# Patient Record
Sex: FEMALE | Race: BLACK OR AFRICAN AMERICAN
[De-identification: names, ages, dates, MRNs, and addresses within clinical notes are randomized per-mention and may not be internally consistent; named-entity substitution may affect disease eponyms.]

---

## 2024-07-15 ENCOUNTER — DOCTOR'S OFFICE (OUTPATIENT)
Age: 24
Setting detail: OPHTHALMOLOGY
End: 2024-07-15
Payer: COMMERCIAL

## 2024-07-15 ENCOUNTER — RX ONLY (RX ONLY)
Age: 24
End: 2024-07-15

## 2024-07-15 DIAGNOSIS — S05.02XA: ICD-10-CM

## 2024-07-15 PROCEDURE — 92071 CONTACT LENS FITTING FOR TX: CPT | Mod: RT | Performed by: OPHTHALMOLOGY

## 2024-07-15 PROCEDURE — 92002 INTRM OPH EXAM NEW PATIENT: CPT | Performed by: OPHTHALMOLOGY

## 2024-07-18 ENCOUNTER — DOCTOR'S OFFICE (OUTPATIENT)
Age: 24
Setting detail: OPHTHALMOLOGY
End: 2024-07-18
Payer: COMMERCIAL

## 2024-07-18 ENCOUNTER — RX ONLY (RX ONLY)
Age: 24
End: 2024-07-18

## 2024-07-18 DIAGNOSIS — S05.02XD: ICD-10-CM

## 2024-07-18 PROCEDURE — 92012 INTRM OPH EXAM EST PATIENT: CPT | Performed by: OPHTHALMOLOGY

## 2025-01-03 ENCOUNTER — EMERGENCY (EMERGENCY)
Facility: HOSPITAL | Age: 25
LOS: 1 days | Discharge: STILL ADMITTED OR ADMINISTRATIVE | End: 2025-01-03
Admitting: EMERGENCY MEDICINE

## 2025-01-03 ENCOUNTER — OUTPATIENT (OUTPATIENT)
Dept: INPATIENT UNIT | Facility: HOSPITAL | Age: 25
LOS: 1 days | Discharge: HOME | End: 2025-01-03
Payer: MEDICAID

## 2025-01-03 ENCOUNTER — APPOINTMENT (OUTPATIENT)
Dept: ANTEPARTUM | Facility: CLINIC | Age: 25
End: 2025-01-03

## 2025-01-03 VITALS
DIASTOLIC BLOOD PRESSURE: 77 MMHG | HEART RATE: 98 BPM | TEMPERATURE: 98 F | RESPIRATION RATE: 20 BRPM | OXYGEN SATURATION: 100 % | SYSTOLIC BLOOD PRESSURE: 101 MMHG

## 2025-01-03 VITALS
WEIGHT: 166.89 LBS | HEART RATE: 101 BPM | TEMPERATURE: 99 F | SYSTOLIC BLOOD PRESSURE: 108 MMHG | RESPIRATION RATE: 20 BRPM | OXYGEN SATURATION: 100 % | DIASTOLIC BLOOD PRESSURE: 67 MMHG

## 2025-01-03 VITALS — TEMPERATURE: 98 F | RESPIRATION RATE: 16 BRPM

## 2025-01-03 VITALS
RESPIRATION RATE: 16 BRPM | DIASTOLIC BLOOD PRESSURE: 71 MMHG | TEMPERATURE: 98 F | SYSTOLIC BLOOD PRESSURE: 106 MMHG | HEART RATE: 102 BPM

## 2025-01-03 DIAGNOSIS — O26.899 OTHER SPECIFIED PREGNANCY RELATED CONDITIONS, UNSPECIFIED TRIMESTER: ICD-10-CM

## 2025-01-03 DIAGNOSIS — Z98.890 OTHER SPECIFIED POSTPROCEDURAL STATES: Chronic | ICD-10-CM

## 2025-01-03 PROCEDURE — 99221 1ST HOSP IP/OBS SF/LOW 40: CPT | Mod: 25

## 2025-01-03 PROCEDURE — L9996: CPT

## 2025-01-03 PROCEDURE — 83986 ASSAY PH BODY FLUID NOS: CPT | Mod: QW

## 2025-01-03 PROCEDURE — 59025 FETAL NON-STRESS TEST: CPT | Mod: 26

## 2025-01-03 NOTE — ED ADULT TRIAGE NOTE - CHIEF COMPLAINT QUOTE
Patient reports that she is 18 weeks pregnant with twins and reports feeling discharge at 3pm. Patient to be taken to L&D as per Cate, patient evaluated by Dr. Morales in triage. No pertinent medical history

## 2025-01-03 NOTE — OB PROVIDER TRIAGE NOTE - HISTORY OF PRESENT ILLNESS
PNC with   PNC with Dr [facility]  25 y/o  TIUP  @ 18.2 wks gestation presents with c/o LOF @ 1500 states had a " gush " of clear fluid and none since then states last had intercourse on 2025 @  denies any uc's or vb states has not felt FM yet denies any fever or chills denies any n/v/d denies any hematuria or dysuria ap care comp by :   TIORALIA - mono- di  PNC with Dr [facility]  23 y/o  TIUP  @ 18.2 wks gestation presents with c/o LOF @ 1500 states had a " gush " of clear fluid and none since then states last had intercourse on 2025 @  denies any uc's or vb states has not felt FM yet denies any fever or chills denies any n/v/d denies any hematuria or dysuria ap care comp by :   TIUP - mono- di   progesterone PV hs pt did not make up prescription

## 2025-01-03 NOTE — OB RN TRIAGE NOTE - CURRENT PREGNANCY COMPLICATIONS, OB PROFILE
Multiple Gestation/Gestational Age less than 36 Weeks mono/di TIUP/Multiple Gestation/Gestational Age less than 36 Weeks

## 2025-01-03 NOTE — OB PROVIDER TRIAGE NOTE - ADDITIONAL INSTRUCTIONS
no evidence of PPROM   maternal and fetal status reassuring   pt to be discharged home   increase fluid intake  follow up with [facility] as sched   follow up with [facility] as sched  w/v discharge instructions given  discharged home   discharged @ 1901

## 2025-01-03 NOTE — OB PROVIDER TRIAGE NOTE - NSHPPHYSICALEXAM_GEN_ALL_CORE
abdomen: soft, nt on palp  SSE: no evidence of pooling  cervical mucous noted   cervix appears closed and posterior   nitrazine- equivocal  fern- negative  sono reports in [facility]  sono images in [facility]   TAS   "A" transverse maternal left anterior placenta MVP: 5.54 FH: 153 bpm   "B" breech anterior placenta MVP: 4.05 FH: 160   T(C): 36.9 (01-03-25 @ 18:37), Max: 37.1 (01-03-25 @ 16:49)  HR: 102 (01-03-25 @ 18:37) (88 - 102)  BP: 106/71 (01-03-25 @ 18:37) (101/77 - 109/70)  RR: 16 (01-03-25 @ 18:37) (16 - 20)  SpO2: 100% (01-03-25 @ 17:39) (100% - 100%) abdomen: soft, nt on palp  SSE: no evidence of pooling  cervical mucous noted   cervix appears closed and posterior   nitrazine- equivocal  fern- negative  sono reports in [facility]  sono images in [facility]   TAS   TVUS: Cervical length 4.3-4.4cm, no dynamic changes  "A" transverse maternal left anterior placenta MVP: 5.54 FH: 153 bpm   "B" breech anterior placenta MVP: 4.05 FH: 160   T(C): 36.9 (01-03-25 @ 18:37), Max: 37.1 (01-03-25 @ 16:49)  HR: 102 (01-03-25 @ 18:37) (88 - 102)  BP: 106/71 (01-03-25 @ 18:37) (101/77 - 109/70)  RR: 16 (01-03-25 @ 18:37) (16 - 20)  SpO2: 100% (01-03-25 @ 17:39) (100% - 100%)

## 2025-01-03 NOTE — OB PROVIDER TRIAGE NOTE - NSOBPROVIDERNOTE_OBGYN_ALL_OB_FT
25 y/o  @ 18.2 TIUP, mono- di, r/o PPROM   plan of care d/w dr Chacon  no evidence of PPROM   maternal and fetal status reassuring   pt to be discharged home   increase fluid intake  follow up with [facility] as sched   follow up with [facility] as sched  w/v discharge instructions given  discharged home   discharged @ 1901 25 y/o  @ 18.2 TIUP, mono- di, r/o PPROM   plan of care d/w dr Chacon  no evidence of PPROM   maternal and fetal status reassuring   pt to be discharged home   increase fluid intake  follow up with [facility] as sched   follow up with [facility] as sched  w/v discharge instructions given  discharged home   discharged @ [id number] pt reports she is starting to have pelvic pain that just started   pt to have her cervix evaluated   will continue to monitor 23 y/o  @ 18.2 TIUP, mono- di, r/o PPROM   plan of care d/w dr Chacon  no evidence of PPROM   maternal and fetal status reassuring   pt to be discharged home   increase fluid intake  follow up with [facility] as sched   follow up with [facility] as sched  w/v discharge instructions given  discharged home   discharged @ 1901 pt reports she is starting to have pelvic pain that just started   pt to have her cervix evaluated   will continue to monitor    @:   Celestina Alcaraz, patient signed out from day shift  Cervical length 4.3-4.4, no dynamic chages  Patient states she has had round ligament pain throughout pregnancy and this feels similar  Has f/u appt on 25  Stable for discharged  Reassuring fetal/maternal status 23 y/o  @ 18.2 TIUP, mono- di, r/o PPROM   plan of care d/w dr Chacon  no evidence of PPROM   maternal and fetal status reassuring   pt to be discharged home   increase fluid intake  follow up with [facility] as sched   follow up with [facility] as sched  w/v discharge instructions given  discharged home   discharged @ 1901 pt reports she is starting to have pelvic pain that just started   pt to have her cervix evaluated   will continue to monitor    @:   Celestina Alcaraz, patient signed out from day shift  Cervical length 4.3-4.4, no dynamic chages  Patient states she has had round ligament pain throughout pregnancy and this feels similar  Has f/u appt on 25  Stable for discharged  Reassuring fetal/maternal status  Discharged @     - Patient to be discharged home with follow up and return precautions  - Please follow up with your obstetrician at your next scheduled appointment.   - Please return for decreased / no fetal movement, vaginal bleeding similar to that of a period, leaking / gush of fluid, regular contractions occurring 4-5 minutes  for one hour or requiring pain medication   - Patient educated of plan and demonstrates understanding. All questions answered. Discharge instructions provided and signed

## 2025-01-04 ENCOUNTER — INPATIENT (INPATIENT)
Facility: HOSPITAL | Age: 25
LOS: 1 days | Discharge: HOME | End: 2025-01-06
Attending: OBSTETRICS & GYNECOLOGY | Admitting: OBSTETRICS & GYNECOLOGY
Payer: MEDICAID

## 2025-01-04 VITALS
SYSTOLIC BLOOD PRESSURE: 117 MMHG | OXYGEN SATURATION: 100 % | DIASTOLIC BLOOD PRESSURE: 97 MMHG | HEART RATE: 123 BPM | RESPIRATION RATE: 22 BRPM

## 2025-01-04 DIAGNOSIS — Z98.890 OTHER SPECIFIED POSTPROCEDURAL STATES: Chronic | ICD-10-CM

## 2025-01-04 DIAGNOSIS — O36.4XX0 MATERNAL CARE FOR INTRAUTERINE DEATH, NOT APPLICABLE OR UNSPECIFIED: ICD-10-CM

## 2025-01-04 LAB
ALBUMIN SERPL ELPH-MCNC: 3.6 G/DL — SIGNIFICANT CHANGE UP (ref 3.3–5)
ALP SERPL-CCNC: 63 U/L — SIGNIFICANT CHANGE UP (ref 40–120)
ALT FLD-CCNC: 50 U/L — HIGH (ref 4–33)
ANION GAP SERPL CALC-SCNC: 12 MMOL/L — SIGNIFICANT CHANGE UP (ref 7–14)
APTT BLD: 26.7 SEC — SIGNIFICANT CHANGE UP (ref 24.5–35.6)
AST SERPL-CCNC: 22 U/L — SIGNIFICANT CHANGE UP (ref 4–32)
BASOPHILS # BLD AUTO: 0.08 K/UL — SIGNIFICANT CHANGE UP (ref 0–0.2)
BASOPHILS NFR BLD AUTO: 0.5 % — SIGNIFICANT CHANGE UP (ref 0–2)
BILIRUB SERPL-MCNC: 0.2 MG/DL — SIGNIFICANT CHANGE UP (ref 0.2–1.2)
BLD GP AB SCN SERPL QL: NEGATIVE — SIGNIFICANT CHANGE UP
BUN SERPL-MCNC: 4 MG/DL — LOW (ref 7–23)
CALCIUM SERPL-MCNC: 9.3 MG/DL — SIGNIFICANT CHANGE UP (ref 8.4–10.5)
CHLORIDE SERPL-SCNC: 103 MMOL/L — SIGNIFICANT CHANGE UP (ref 98–107)
CO2 SERPL-SCNC: 20 MMOL/L — LOW (ref 22–31)
CREAT SERPL-MCNC: 0.57 MG/DL — SIGNIFICANT CHANGE UP (ref 0.5–1.3)
EGFR: 128 ML/MIN/1.73M2 — SIGNIFICANT CHANGE UP
EOSINOPHIL # BLD AUTO: 0.28 K/UL — SIGNIFICANT CHANGE UP (ref 0–0.5)
EOSINOPHIL NFR BLD AUTO: 1.7 % — SIGNIFICANT CHANGE UP (ref 0–6)
FIBRINOGEN PPP-MCNC: 668 MG/DL — HIGH (ref 200–465)
GLUCOSE SERPL-MCNC: 85 MG/DL — SIGNIFICANT CHANGE UP (ref 70–99)
HCT VFR BLD CALC: 32.3 % — LOW (ref 34.5–45)
HGB BLD-MCNC: 10.3 G/DL — LOW (ref 11.5–15.5)
IANC: 11.52 K/UL — HIGH (ref 1.8–7.4)
IMM GRANULOCYTES NFR BLD AUTO: 2.5 % — HIGH (ref 0–0.9)
INR BLD: 0.94 RATIO — SIGNIFICANT CHANGE UP (ref 0.85–1.16)
LACTATE SERPL-SCNC: 1.6 MMOL/L — SIGNIFICANT CHANGE UP (ref 0.5–2)
LYMPHOCYTES # BLD AUTO: 12.4 % — LOW (ref 13–44)
LYMPHOCYTES # BLD AUTO: 2 K/UL — SIGNIFICANT CHANGE UP (ref 1–3.3)
MCHC RBC-ENTMCNC: 27 PG — SIGNIFICANT CHANGE UP (ref 27–34)
MCHC RBC-ENTMCNC: 31.9 G/DL — LOW (ref 32–36)
MCV RBC AUTO: 84.8 FL — SIGNIFICANT CHANGE UP (ref 80–100)
MONOCYTES # BLD AUTO: 1.88 K/UL — HIGH (ref 0–0.9)
MONOCYTES NFR BLD AUTO: 11.6 % — SIGNIFICANT CHANGE UP (ref 2–14)
NEUTROPHILS # BLD AUTO: 11.52 K/UL — HIGH (ref 1.8–7.4)
NEUTROPHILS NFR BLD AUTO: 71.3 % — SIGNIFICANT CHANGE UP (ref 43–77)
NRBC # BLD: 0 /100 WBCS — SIGNIFICANT CHANGE UP (ref 0–0)
NRBC # FLD: 0.02 K/UL — HIGH (ref 0–0)
PLATELET # BLD AUTO: 365 K/UL — SIGNIFICANT CHANGE UP (ref 150–400)
POTASSIUM SERPL-MCNC: 3.7 MMOL/L — SIGNIFICANT CHANGE UP (ref 3.5–5.3)
POTASSIUM SERPL-SCNC: 3.7 MMOL/L — SIGNIFICANT CHANGE UP (ref 3.5–5.3)
PROT SERPL-MCNC: 6.6 G/DL — SIGNIFICANT CHANGE UP (ref 6–8.3)
PROTHROM AB SERPL-ACNC: 11.2 SEC — SIGNIFICANT CHANGE UP (ref 9.9–13.4)
RBC # BLD: 3.81 M/UL — SIGNIFICANT CHANGE UP (ref 3.8–5.2)
RBC # FLD: 14.1 % — SIGNIFICANT CHANGE UP (ref 10.3–14.5)
RH IG SCN BLD-IMP: POSITIVE — SIGNIFICANT CHANGE UP
RH IG SCN BLD-IMP: POSITIVE — SIGNIFICANT CHANGE UP
SODIUM SERPL-SCNC: 135 MMOL/L — SIGNIFICANT CHANGE UP (ref 135–145)
T PALLIDUM AB TITR SER: NEGATIVE — SIGNIFICANT CHANGE UP
WBC # BLD: 16.17 K/UL — HIGH (ref 3.8–10.5)
WBC # FLD AUTO: 16.17 K/UL — HIGH (ref 3.8–10.5)

## 2025-01-04 PROCEDURE — 88304 TISSUE EXAM BY PATHOLOGIST: CPT | Mod: 26

## 2025-01-04 PROCEDURE — 99284 EMERGENCY DEPT VISIT MOD MDM: CPT

## 2025-01-04 PROCEDURE — 88307 TISSUE EXAM BY PATHOLOGIST: CPT | Mod: 26

## 2025-01-04 RX ORDER — OXYTOCIN IN 5 % DEXTROSE 20/500ML
167 PLASTIC BAG, INJECTION (ML) INTRAVENOUS
Qty: 30 | Refills: 0 | Status: DISCONTINUED | OUTPATIENT
Start: 2025-01-04 | End: 2025-01-05

## 2025-01-04 RX ORDER — BENZOCAINE/MENTH/CETYLPYRD CL 15 MG-4 MG
1 LOZENGE MUCOUS MEMBRANE EVERY 6 HOURS
Refills: 0 | Status: DISCONTINUED | OUTPATIENT
Start: 2025-01-04 | End: 2025-01-06

## 2025-01-04 RX ORDER — MAGNESIUM HYDROXIDE 400 MG/5ML
30 SUSPENSION, ORAL (FINAL DOSE FORM) ORAL
Refills: 0 | Status: DISCONTINUED | OUTPATIENT
Start: 2025-01-04 | End: 2025-01-06

## 2025-01-04 RX ORDER — PIPERACILLIN AND TAZOBACTAM 3; .375 G/15ML; G/15ML
4.5 INJECTION, POWDER, LYOPHILIZED, FOR SOLUTION INTRAVENOUS EVERY 8 HOURS
Refills: 0 | Status: COMPLETED | OUTPATIENT
Start: 2025-01-04 | End: 2025-01-04

## 2025-01-04 RX ORDER — DOXYCYCLINE MONOHYDRATE 100 MG
200 TABLET ORAL ONCE
Refills: 0 | Status: DISCONTINUED | OUTPATIENT
Start: 2025-01-04 | End: 2025-01-06

## 2025-01-04 RX ORDER — KETOROLAC TROMETHAMINE 30 MG/ML
30 INJECTION INTRAMUSCULAR; INTRAVENOUS ONCE
Refills: 0 | Status: DISCONTINUED | OUTPATIENT
Start: 2025-01-04 | End: 2025-01-04

## 2025-01-04 RX ORDER — SODIUM CHLORIDE 9 MG/ML
3 INJECTION, SOLUTION INTRAMUSCULAR; INTRAVENOUS; SUBCUTANEOUS EVERY 8 HOURS
Refills: 0 | Status: DISCONTINUED | OUTPATIENT
Start: 2025-01-04 | End: 2025-01-06

## 2025-01-04 RX ORDER — PIPERACILLIN AND TAZOBACTAM 3; .375 G/15ML; G/15ML
4.5 INJECTION, POWDER, LYOPHILIZED, FOR SOLUTION INTRAVENOUS EVERY 8 HOURS
Refills: 0 | Status: DISCONTINUED | OUTPATIENT
Start: 2025-01-04 | End: 2025-01-04

## 2025-01-04 RX ORDER — PNV/FERROUS FUM/DOCUSATE/FOLIC 90-50-1MG
1 TABLET, EXTENDED RELEASE ORAL DAILY
Refills: 0 | Status: DISCONTINUED | OUTPATIENT
Start: 2025-01-04 | End: 2025-01-05

## 2025-01-04 RX ORDER — SODIUM CHLORIDE 9 MG/ML
1000 INJECTION, SOLUTION INTRAVENOUS
Refills: 0 | Status: DISCONTINUED | OUTPATIENT
Start: 2025-01-04 | End: 2025-01-04

## 2025-01-04 RX ORDER — LANOLIN
1 OINTMENT (GRAM) TOPICAL EVERY 6 HOURS
Refills: 0 | Status: DISCONTINUED | OUTPATIENT
Start: 2025-01-04 | End: 2025-01-06

## 2025-01-04 RX ORDER — OXYCODONE HCL 15 MG
5 TABLET ORAL ONCE
Refills: 0 | Status: DISCONTINUED | OUTPATIENT
Start: 2025-01-04 | End: 2025-01-06

## 2025-01-04 RX ORDER — ACETAMINOPHEN 80 MG/.8ML
1000 SOLUTION/ DROPS ORAL ONCE
Refills: 0 | Status: COMPLETED | OUTPATIENT
Start: 2025-01-04 | End: 2025-01-04

## 2025-01-04 RX ORDER — DIPHENHYDRAMINE HCL 25 MG
25 TABLET ORAL EVERY 6 HOURS
Refills: 0 | Status: DISCONTINUED | OUTPATIENT
Start: 2025-01-04 | End: 2025-01-06

## 2025-01-04 RX ORDER — OXYCODONE HCL 15 MG
5 TABLET ORAL
Refills: 0 | Status: DISCONTINUED | OUTPATIENT
Start: 2025-01-04 | End: 2025-01-06

## 2025-01-04 RX ORDER — IBUPROFEN 200 MG
600 TABLET ORAL EVERY 6 HOURS
Refills: 0 | Status: DISCONTINUED | OUTPATIENT
Start: 2025-01-04 | End: 2025-01-06

## 2025-01-04 RX ORDER — WITCH HAZEL 0.5 ML/ML
1 SOLUTION TOPICAL EVERY 4 HOURS
Refills: 0 | Status: DISCONTINUED | OUTPATIENT
Start: 2025-01-04 | End: 2025-01-06

## 2025-01-04 RX ORDER — SIMETHICONE 125 MG/1
80 CAPSULE, LIQUID FILLED ORAL EVERY 4 HOURS
Refills: 0 | Status: DISCONTINUED | OUTPATIENT
Start: 2025-01-04 | End: 2025-01-06

## 2025-01-04 RX ORDER — CLOSTRIDIUM TETANI TOXOID ANTIGEN (FORMALDEHYDE INACTIVATED), CORYNEBACTERIUM DIPHTHERIAE TOXOID ANTIGEN (FORMALDEHYDE INACTIVATED), BORDETELLA PERTUSSIS TOXOID ANTIGEN (GLUTARALDEHYDE INACTIVATED), BORDETELLA PERTUSSIS FILAMENTOUS HEMAGGLUTININ ANTIGEN (FORMALDEHYDE INACTIVATED), BORDETELLA PERTUSSIS PERTACTIN ANTIGEN, AND BORDETELLA PERTUSSIS FIMBRIAE 2/3 ANTIGEN 5; 2; 2.5; 5; 3; 5 [LF]/.5ML; [LF]/.5ML; UG/.5ML; UG/.5ML; UG/.5ML; UG/.5ML
0.5 INJECTION, SUSPENSION INTRAMUSCULAR ONCE
Refills: 0 | Status: DISCONTINUED | OUTPATIENT
Start: 2025-01-04 | End: 2025-01-06

## 2025-01-04 RX ORDER — HYDROCORTISONE 1 %
1 CREAM (GRAM) TOPICAL EVERY 6 HOURS
Refills: 0 | Status: DISCONTINUED | OUTPATIENT
Start: 2025-01-04 | End: 2025-01-06

## 2025-01-04 RX ORDER — KETOROLAC TROMETHAMINE 30 MG/ML
30 INJECTION INTRAMUSCULAR; INTRAVENOUS ONCE
Refills: 0 | Status: DISCONTINUED | OUTPATIENT
Start: 2025-01-04 | End: 2025-01-06

## 2025-01-04 RX ORDER — CHLORHEXIDINE GLUCONATE 1.2 MG/ML
1 RINSE ORAL DAILY
Refills: 0 | Status: DISCONTINUED | OUTPATIENT
Start: 2025-01-04 | End: 2025-01-04

## 2025-01-04 RX ORDER — ACETAMINOPHEN 80 MG/.8ML
975 SOLUTION/ DROPS ORAL
Refills: 0 | Status: DISCONTINUED | OUTPATIENT
Start: 2025-01-04 | End: 2025-01-06

## 2025-01-04 RX ORDER — CITRIC ACID/SODIUM CITRATE 334-500MG
15 SOLUTION, ORAL ORAL EVERY 6 HOURS
Refills: 0 | Status: DISCONTINUED | OUTPATIENT
Start: 2025-01-04 | End: 2025-01-04

## 2025-01-04 RX ADMIN — KETOROLAC TROMETHAMINE 30 MILLIGRAM(S): 30 INJECTION INTRAMUSCULAR; INTRAVENOUS at 14:41

## 2025-01-04 RX ADMIN — ACETAMINOPHEN 1000 MILLIGRAM(S): 80 SOLUTION/ DROPS ORAL at 08:01

## 2025-01-04 RX ADMIN — PIPERACILLIN AND TAZOBACTAM 200 GRAM(S): 3; .375 INJECTION, POWDER, LYOPHILIZED, FOR SOLUTION INTRAVENOUS at 23:10

## 2025-01-04 RX ADMIN — SODIUM CHLORIDE 3 MILLILITER(S): 9 INJECTION, SOLUTION INTRAMUSCULAR; INTRAVENOUS; SUBCUTANEOUS at 21:29

## 2025-01-04 RX ADMIN — ACETAMINOPHEN 400 MILLIGRAM(S): 80 SOLUTION/ DROPS ORAL at 07:50

## 2025-01-04 RX ADMIN — PIPERACILLIN AND TAZOBACTAM 200 GRAM(S): 3; .375 INJECTION, POWDER, LYOPHILIZED, FOR SOLUTION INTRAVENOUS at 07:27

## 2025-01-04 RX ADMIN — KETOROLAC TROMETHAMINE 30 MILLIGRAM(S): 30 INJECTION INTRAMUSCULAR; INTRAVENOUS at 14:15

## 2025-01-04 RX ADMIN — Medication 167 MILLIUNIT(S)/MIN: at 20:14

## 2025-01-04 RX ADMIN — SODIUM CHLORIDE 125 MILLILITER(S): 9 INJECTION, SOLUTION INTRAVENOUS at 14:05

## 2025-01-04 RX ADMIN — CHLORHEXIDINE GLUCONATE 1 APPLICATION(S): 1.2 RINSE ORAL at 14:04

## 2025-01-04 NOTE — OB PROVIDER H&P - ASSESSMENT
Assessment  24y  @ 18w3d with  delivery of Twin A extramurally and with previable PROM and likely  delivery of Twin B. No resuscitative options at this time. Admission temperature 38.1C, so patient will be treated for suspected chorioamnionitis.     1. Admit to L+D. Routine Labs. IVF.  2. Expectant management.  3. no EFM or toco  4. Prenatal issues: mono-di TIUP  5. Will assess patient's desires for burial/autopsy/genetics  6. Pain: IV pain meds/epidural PRN  7. Cont Zosyn q8h    Plan d/w Dr. Oliverio Shah MD  PGY3

## 2025-01-04 NOTE — OB PROVIDER DELIVERY SUMMARY - NSSELHIDDEN_OBGYN_ALL_OB_FT
[NS_DeliveryAttending1_OBGYN_ALL_OB_FT:[id number]=],[NS_DeliveryAssist1_OBGYN_ALL_OB_FT:[id number]=]

## 2025-01-04 NOTE — OB PROVIDER H&P - HISTORY OF PRESENT ILLNESS
R3 Admission H&P    Subjective  HPI: 24y  @ 18w3d, with mono-di TIUP, who was seen in the ED for Code OB called overhead. Patient came to the hospital after delivery baby A in the car around 550a. Patient states she did not feel any contractions and just "felt it slide out". Unsure when she broke her water. However, patient had been seen in triage the day prior (1/3) for rule out rupture. Evaluation at that time showed equivocal nitrazine, neg ferning, neg pooling. CL was 4.3-4.4cm, and MVP>4 for both Twin A and B. Patient was ultimately discharged home with strict return precautions yesterday.   Currently denies any fevers, chills or abdominal pain.       – PNC: mono-di TIUP  – OBHx:   – GynHx: denies fibroids, cysts, endometriosis, abnormal pap smears, STIs  – PMH: denies  – PSH: h/o heart surgery @ 1yo (mom states "they closed something")  – Psych: denies   – Social: denies   – Meds: PNV, prescribed vaginal progesterone but patient never filled rx  – Allergies: NKDA  – Will accept blood transfusions? Yes

## 2025-01-04 NOTE — ED PROVIDER NOTE - PHYSICAL EXAMINATION
Velma FLOREZ:  VITALS: Initial triage and subsequent vitals have been reviewed by me.  GEN APPEARANCE: Alert, cooperative. Non-toxic appearing. Well appearing. NAD.  HEAD: Atraumatic, normocephalic   EYES: PERRLa, EOMI, vision grossly intact.   EARS: Gross hearing intact.   NOSE: No nasal discharge, no external evidence of epistaxis.   NECK: Supple  CV: RRR, S1S2, no c/r/m/g. No cyanosis. Extremities warm, well perfused. Cap refill <2 seconds. No bruits.   LUNGS: CTAB. No wheezing. No rales. No rhonchi. No diminished breath sounds.   ABDOMEN: Soft, NTND. No guarding or rebound. No masses.   MSK/EXT: Spine appears normal, no spine point tenderness. No CVA ttp. Normal muscular development. Pelvis stable. No obvious joint or bony deformity, no peripheral edema.   NEURO: Alert, follows commands. Weight bearing normal. Speech normal. Sensation and motor normal x4 extremities.   SKIN: Normal color for race, warm, dry and intact. No evidence of rash.  PSYCH: Normal mood and affect.   Exam (Female): active passage of x1 fetus, umbilical cord still attached, no delivery of placenta   EXAM WITH: Shefali FLOREZ Velma FLOREZ:  VITALS: Initial triage and subsequent vitals have been reviewed by me.  GEN APPEARANCE: Alert, cooperative. Non-toxic appearing. Well appearing. NAD.  HEAD: Atraumatic, normocephalic   EYES: PERRLa, EOMI, vision grossly intact.   EARS: Gross hearing intact.   NOSE: No nasal discharge, no external evidence of epistaxis.   NECK: Supple  CV: RRR, S1S2, no c/r/m/g. No cyanosis. Extremities warm, well perfused. Cap refill <2 seconds. No bruits.   LUNGS: CTAB. No wheezing. No rales. No rhonchi. No diminished breath sounds.   ABDOMEN: Soft, NTND. No guarding or rebound. No masses.   MSK/EXT: Spine appears normal, no spine point tenderness. No CVA ttp. Normal muscular development. Pelvis stable. No obvious joint or bony deformity, no peripheral edema.   NEURO: Alert, follows commands. Weight bearing normal. Speech normal. Sensation and motor normal x4 extremities.   SKIN: Normal color for race, warm, dry and intact. No evidence of rash.  PSYCH: Normal mood and affect.   Exam (Female): active passage of x1 fetus, umbilical cord still attached, no delivery of placenta , no fetal activity   EXAM WITH: Shefali FLOREZ

## 2025-01-04 NOTE — OB RN INTRAOPERATIVE NOTE - NSOBSELHIDDEN_OBGYN_ALL_OB_FT
[NSOBAttendingProcedure1_OBGYN_ALL_OB_FT:[id number]],[NSRNCirculatorProcedure1_OBGYN_ALL_OB_FT:[id number]=]

## 2025-01-04 NOTE — ED PROVIDER NOTE - CLINICAL SUMMARY MEDICAL DECISION MAKING FREE TEXT BOX
Velma FLOREZ:   Exam patient arrives tachycardic uncomfortable appearing with physical exam as above, noted to have a passage of a single fetus that did not demonstrate any spontaneous movement suspect likely nonviable delivery given 18 weeks age of gestation, given [place] what a  code 100 and code OB was called, OB team evaluated patient at bedside, they would like patient transported to L&D for further eval.  Will dispo to L&D.

## 2025-01-04 NOTE — OB RN DELIVERY SUMMARY - NSSELHIDDEN_OBGYN_ALL_OB_FT
[NS_DeliveryAttending1_OBGYN_ALL_OB_FT:[id number]],[NS_DeliveryAssist1_OBGYN_ALL_OB_FT:[id number]],[NS_DeliveryRN_OBGYN_ALL_OB_FT:[id number]]

## 2025-01-04 NOTE — OB RN INTRAOPERATIVE NOTE - NSSELHIDDEN_OBGYN_ALL_OB_FT
[NS_DeliveryAttending1_OBGYN_ALL_OB_FT:[id number]=],[NS_DeliveryAssist1_OBGYN_ALL_OB_FT:[id number]=]
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
## 2025-01-04 NOTE — OB PROVIDER DELIVERY SUMMARY - NSLOWPPHRISK_OBGYN_A_OB
No previous uterine incision/Romero Pregnancy/Less than or equal to 4 previous vaginal births/No known bleeding disorder/No history of postpartum hemorrhage

## 2025-01-04 NOTE — PERINATAL/NEONATAL BEREAVEMENT NOTE - VIEW DONE
at this time, patient states not wanting to view or hold baby A/no (specify) 12-Dec-2018 03-Dec-2018

## 2025-01-04 NOTE — BRIEF OPERATIVE NOTE - OPERATION/FINDINGS
EUA: Grossly normal appearing external genitalia, cervix and vagina. Uterus mobile approximately 14 weeks in size with nonpalpable adnexa bilaterally  Thin endometrial stripe seen on TAUS at conclusion of case

## 2025-01-04 NOTE — CHART NOTE - NSCHARTNOTEFT_GEN_A_CORE
Pt seen at bedside to sign consent forms. Counseled patient and family members at bedside about concern for infection and the recommendation to start cytotec to evacuate uterine contents in order to avoid worsening infection and possible ICU admission. Pt declining induction at this time. Pt denies fever/chills/chest pain/SOB/contractions/cramping/pressure in vagina.    - CMP/lactate ordered    D/w Dr. Nahomi Steel PGY3 Pt seen at beside with Dr. Comer. Multiple support people present at bedside.   Reviewed the patients clinical situation and the leading diagnosis of chorioamnionitis given that the patient was febrile on presentation after spontaneous delivery of infant A. Mild leukocytosis of 16.   Pt is overall feeling well at this time.  Pt denies fever/chills/chest pain/SOB/contractions/cramping/pressure in vagina. No vaginal bleeding or leakage of fluid.    BSUS demonstrated that placenta and infant is still fundal.   VE is 4cm / 80 / -3  Intact with bulging membranes at the level of the cervix.    Recommended induction of  labor for infant B given GA of 18w, diagnosis of chorioamnionitis in the setting of mono/di TIUP, and advanced cervical dilation.   Pt desires expectant manangement at this time. Discussed the concern for sepsis, ICU admissions, and potential hysterectomy if source of infection is not addressed.   Pt requesting more time to consider.     Discussed the role of placental cultures and sending the placenta to pathology upon delivery       Addendum   Pt seen at bedside to sign consent forms. Counseled patient and family members at bedside about concern for infection and the recommendation to start cytotec to evacuate uterine contents in order to avoid worsening infection and possible ICU admission. Pt declining induction at this time. Pt denies fever/chills/chest pain/SOB/contractions/cramping/pressure in vagina.    - CMP/lactate ordered    D/w Dr. Nahomi Steel PGY3 Pt seen at beside with Dr. Comer. Multiple support people present at bedside.   Reviewed the patients clinical situation and the leading diagnosis of chorioamnionitis given that the patient was febrile on presentation after spontaneous delivery of infant A. Mild leukocytosis of 16.   Pt is overall feeling well at this time.  Pt denies fever/chills/chest pain/SOB/contractions/cramping/pressure in vagina. No vaginal bleeding or leakage of fluid.    BSUS demonstrated that placenta and infant is still fundal.   VE is 4cm / 80 / -3  Intact with bulging membranes at the level of the cervix.    Recommended induction of  labor for infant B given GA of 18w, diagnosis of chorioamnionitis in the setting of mono/di TIUP, and advanced cervical dilation.   Pt desires expectant management at this time. Discussed the concern for sepsis, ICU admissions, and potential hysterectomy if source of infection is not addressed.   Pt requesting more time to consider.     Discussed the role of placental cultures and sending the placenta to pathology upon delivery  Discussed autopsy, cytogenics, and burial processes. All questions answered. Pt would like additional time to consider her options.   Pt seen with Dr. Nahomi Arreola, PGY 4       Addendum   Pt seen at bedside to sign consent forms. Counseled patient and family members at bedside about concern for infection and the recommendation to start cytotec to evacuate uterine contents in order to avoid worsening infection and possible ICU admission. Pt declining induction at this time. Pt denies fever/chills/chest pain/SOB/contractions/cramping/pressure in vagina.    - CMP/lactate ordered    D/w Dr. Nahomi Steel PGY3

## 2025-01-04 NOTE — PERINATAL/NEONATAL BEREAVEMENT NOTE - TOUCH/HOLDING DONE
Pulmonary Clinic Progress Note    Subjective:  Chief Complaint:  Chief Complaint   Patient presents with   • Bronchitis   • Follow-up       Primary Care Physician:  Christ Chau MD    History of Present Illness:  Ayush Currie is a 55 year old male lifelong nonsmoker with a history of bronchiectasis. Patient is here for follow-up. He is accompanied by his second brother. Patient main concern is an exertional dyspnea. He gets breathless after walking about a block. He gets intermittent wheezing. Cough that's productive of small amount of green sputum. No fever, chills, night sweats. No chest pain, palpitations. No joint swelling, skin rash.     8/22/2018 Ayush Currie is a 54 year old male lifelong nonsmoker. Patient is here for follow-up on bronchiectasis. Patient stated that he has been using his Breo daily. His breathing is stable. He gets breathless with moderate exertion. He denies any cough, sputum, chest tightness, wheeze. No joint pain, swelling, skin rash. No fever, chills, weight loss.      2/20/2018Ayush Currie is a 54 year old male lifelong nonsmoker. Patient was referred by Dr. Chau for evaluation of an abnormal CT scan. Patient has no known lung disease. He had a chest x-ray for preoperative evaluation which was abnormal. Subsequently underwent a CT scan that showed evidence of granulomatous lung disease with bilateral upper lobe contractures and volume loss. Patient complains of dyspnea with moderate exertion that has been stable to improving over the last 6 months. He gets breathless when climbing stairs. He is not aware of any alleviating factors but rest. No associated symptoms. No fever, chills, cough. No wheeze, chest tightness, congestion. No fever, chills, weight loss. No history of pneumonia  Patient lives the early part of his life in [place]. No hazardous occupational or environmental exposures.     ALLERGIES:  No Known Allergies       Current Outpatient Medications    Medication Sig   • diclofenac (VOLTAREN) 50 MG EC tablet Take 1 tablet by mouth 2 times daily.   • fenofibrate 160 MG tablet Take 1 tablet by mouth daily.   • fluticasone-vilanterol (BREO ELLIPTA) 200-25 MCG/INH inhaler Inhale 1 puff into the lungs daily.   • lisinopril-hydroCHLOROthiazide (PRINZIDE,ZESTORETIC) 20-12.5 MG per tablet Take 1 tablet by mouth daily.   • metFORMIN (GLUCOPHAGE) 500 MG tablet Take 1 tablet by mouth 2 times daily (with meals).   • pravastatin (PRAVACHOL) 40 MG tablet Take 1 tablet by mouth daily.   • warfarin (COUMADIN) 2 MG tablet Dose based on INR result. Patient may take up to 5 tablets daily   • oxyCODONE, IMM REL, (ROXICODONE) 5 MG immediate release tablet Take 1-2 tablets by mouth every 4 hours as needed for Pain.   • morphine SR (MS CONTIN) 15 MG 12 hr tablet Take 1 tablet by mouth 2 times daily.   • albuterol (PROAIR RESPICLICK) 108 (90 Base) MCG/ACT inhaler Inhale 2 puffs into the lungs every 4 hours as needed for Shortness of Breath or Wheezing.     No current facility-administered medications for this visit.          Past Medical History:   Diagnosis Date   • Diabetes mellitus (CMS/HCC)    • High cholesterol    • Hypertension           Past Surgical History:   Procedure Laterality Date   • Total knee replacement Right 01/10/2018    Dr Womack       Physical examination:     Vital Last Value   Temperature     Pulse 71 (04/23/19 0758)   Respiratory     Non-Invasive  Blood Pressure 152/90 (04/23/19 0758)   Pulse Oximetry 98 % (04/23/19 0758)     General: Alert and oriented times 3, not in distress.  HEENT/Neck: No Pallor, no icterus, no injection, no JVD (jugular venous distention).  Heart: Normal S1, S2, RRR (regular rate and rhythm), no GMR (gallop, murmur, rub).  Lungs: Good air entry bilaterally, crackles, wheeze. Percussion note normal  Extremities: No LE (lower extremity) swelling or tenderness, no erythema.  Neurologic: Normal speech, moves all 4 extrmities.  Skin: warm to  touch, no rashes.      Labs    WBC (K/mcL)   Date Value   12/19/2017 5.9     RBC (mil/mcL)   Date Value   12/19/2017 4.18 (L)     HCT (%)   Date Value   01/11/2018 34.1 (L)   12/19/2017 38.0 (L)     HGB (g/dL)   Date Value   01/11/2018 11.7 (L)   12/19/2017 13.7     PLT (K/mcL)   Date Value   12/19/2017 274       Sodium (mmol/L)   Date Value   04/18/2019 138     Potassium (mmol/L)   Date Value   04/18/2019 3.8     Chloride (mmol/L)   Date Value   04/18/2019 105     Glucose (mg/dL)   Date Value   04/18/2019 106 (H)     CALCIUM (mg/dL)   Date Value   04/18/2019 9.2     Carbon Dioxide (mmol/L)   Date Value   04/18/2019 27     BUN (mg/dL)   Date Value   04/18/2019 13     Creatinine (mg/dL)   Date Value   04/18/2019 0.93     No components found for: ABG      IMAGING AND OTHER STUDIES    Chest X-Ray:   No new images    CT scan of the chest:  No new images       Pulmonary Function Tests:   Moderate obstructive impairment    Assessment:    Ayush Currie is a 55 year old male lifelong nonsmoker with a history of bronchiectasis. Patient is here for follow-up. It seems that his respiratory status is stable    Bronchiectasis bilateral upper lung zone predominant undifferentiated etiology      Plan:    Continue Breo 200, 1 puff nightly  Albuterol as needed  Aerobika airway clearance      Follow up: Return in about 6 months (around 10/23/2019).     Mary Ramos MD  Pulmonary & Critical Care Medicine        at this time, patient states not wanting to view or hold baby A/no (specify)

## 2025-01-04 NOTE — ED PROVIDER NOTE - OBJECTIVE STATEMENT
Velma FLOREZ: 24-year-old female, currently 18 weeks pregnant with twin gestation, presents to ED with a chief complaint of concern for vaginal delivery, patient had a single fetus delivered in the parking lot upon ED arrival, there was concern at that fetus did not appear to responding.  Patient reports she was recently seen for concern for rupture of membranes. Pt denies any CP SOB or BARBOUR has mild lower abdominal pain, can move and feel everything.
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
## 2025-01-04 NOTE — OB PROVIDER DELIVERY SUMMARY - NSPROVIDERDELIVERYNOTE_OBGYN_ALL_OB_FT
Demised female infant delivered en caul in transverse position. No fetal movement seen.    Placenta was partially delivered spontaneously not intact. Pt counseled on options to remove retained placenta including uterotonics and D&C. Pt opting for D&C.    No lacerations seen    Adequate hemostasis. QBL: 200  Count correct x2.

## 2025-01-04 NOTE — BRIEF OPERATIVE NOTE - NSICDXBRIEFPROCEDURE_GEN_ALL_CORE_FT
PROCEDURES:  Dilation and curettage, uterus, using suction, with US guidance 04-Jan-2025 18:11:49  Aiyana Steel  Exam under anesthesia, pelvic 04-Jan-2025 18:11:56  Aiyana Steel

## 2025-01-04 NOTE — OB RN DELIVERY SUMMARY - NS_GENERALBABYACOMMENTA_OBGYN_ALL_OB_FT
patient requests not to view fetus apgars were not determined because patient delivered at home alone

## 2025-01-04 NOTE — OB PROVIDER H&P - NSHPPHYSICALEXAM_GEN_ALL_CORE
Objective  – VS  T(C): 38.1 (01-04-25 @ 06:30)  HR: 106 (01-04-25 @ 08:21)  BP: 114/58 (01-04-25 @ 08:06)  RR: 19 (01-04-25 @ 06:30)  SpO2: 100% (01-04-25 @ 08:26)    Physical Exam  CV: RRR  Pulm: breathing comfortably on RA  Abd: gravid, nontender  Extr: moving all extremities with ease

## 2025-01-05 LAB
HCT VFR BLD CALC: 25.7 % — LOW (ref 34.5–45)
HGB BLD-MCNC: 8.6 G/DL — LOW (ref 11.5–15.5)
MCHC RBC-ENTMCNC: 27.7 PG — SIGNIFICANT CHANGE UP (ref 27–34)
MCHC RBC-ENTMCNC: 33.5 G/DL — SIGNIFICANT CHANGE UP (ref 32–36)
MCV RBC AUTO: 82.9 FL — SIGNIFICANT CHANGE UP (ref 80–100)
NRBC # BLD: 0 /100 WBCS — SIGNIFICANT CHANGE UP (ref 0–0)
NRBC # FLD: 0 K/UL — SIGNIFICANT CHANGE UP (ref 0–0)
PLATELET # BLD AUTO: 334 K/UL — SIGNIFICANT CHANGE UP (ref 150–400)
RBC # BLD: 3.1 M/UL — LOW (ref 3.8–5.2)
RBC # FLD: 13.6 % — SIGNIFICANT CHANGE UP (ref 10.3–14.5)
WBC # BLD: 22.68 K/UL — HIGH (ref 3.8–10.5)
WBC # FLD AUTO: 22.68 K/UL — HIGH (ref 3.8–10.5)

## 2025-01-05 RX ORDER — FERROUS SULFATE 325(65) MG
325 TABLET ORAL THREE TIMES A DAY
Refills: 0 | Status: DISCONTINUED | OUTPATIENT
Start: 2025-01-05 | End: 2025-01-06

## 2025-01-05 RX ORDER — SENNOSIDES 8.6 MG/1
2 TABLET, FILM COATED ORAL AT BEDTIME
Refills: 0 | Status: DISCONTINUED | OUTPATIENT
Start: 2025-01-05 | End: 2025-01-06

## 2025-01-05 RX ORDER — ASCORBIC ACID 1000 MG
500 TABLET ORAL DAILY
Refills: 0 | Status: DISCONTINUED | OUTPATIENT
Start: 2025-01-05 | End: 2025-01-06

## 2025-01-05 RX ORDER — PIPERACILLIN AND TAZOBACTAM 3; .375 G/15ML; G/15ML
3.38 INJECTION, POWDER, LYOPHILIZED, FOR SOLUTION INTRAVENOUS EVERY 8 HOURS
Refills: 0 | Status: DISCONTINUED | OUTPATIENT
Start: 2025-01-05 | End: 2025-01-06

## 2025-01-05 RX ADMIN — Medication 500 MILLIGRAM(S): at 11:24

## 2025-01-05 RX ADMIN — SODIUM CHLORIDE 3 MILLILITER(S): 9 INJECTION, SOLUTION INTRAMUSCULAR; INTRAVENOUS; SUBCUTANEOUS at 06:07

## 2025-01-05 RX ADMIN — SODIUM CHLORIDE 3 MILLILITER(S): 9 INJECTION, SOLUTION INTRAMUSCULAR; INTRAVENOUS; SUBCUTANEOUS at 13:05

## 2025-01-05 RX ADMIN — SENNOSIDES 2 TABLET(S): 8.6 TABLET, FILM COATED ORAL at 22:38

## 2025-01-05 RX ADMIN — Medication 325 MILLIGRAM(S): at 11:24

## 2025-01-05 RX ADMIN — PIPERACILLIN AND TAZOBACTAM 25 GRAM(S): 3; .375 INJECTION, POWDER, LYOPHILIZED, FOR SOLUTION INTRAVENOUS at 17:57

## 2025-01-05 RX ADMIN — SODIUM CHLORIDE 3 MILLILITER(S): 9 INJECTION, SOLUTION INTRAMUSCULAR; INTRAVENOUS; SUBCUTANEOUS at 22:17

## 2025-01-05 RX ADMIN — Medication 325 MILLIGRAM(S): at 22:38

## 2025-01-05 RX ADMIN — PIPERACILLIN AND TAZOBACTAM 25 GRAM(S): 3; .375 INJECTION, POWDER, LYOPHILIZED, FOR SOLUTION INTRAVENOUS at 09:36

## 2025-01-05 NOTE — PROVIDER CONTACT NOTE (OTHER) - SITUATION
Pt c/o right calf pain again. Pt said that earlier this AM it was completely relieved and no longer bothering her. This evening she is complaining again of the same pain.
Pt c/o of "cramping" right calf pain. Pt expressed some relief after walking.

## 2025-01-05 NOTE — PROVIDER CONTACT NOTE (OTHER) - ACTION/TREATMENT ORDERED:
MD aware, no further orders given. Safety precautions maintained.
MD aware, will place order for LE ultrasound. Safety precautions maintained.

## 2025-01-06 ENCOUNTER — TRANSCRIPTION ENCOUNTER (OUTPATIENT)
Age: 25
End: 2025-01-06

## 2025-01-06 ENCOUNTER — RESULT REVIEW (OUTPATIENT)
Age: 25
End: 2025-01-06

## 2025-01-06 VITALS
RESPIRATION RATE: 15 BRPM | SYSTOLIC BLOOD PRESSURE: 106 MMHG | TEMPERATURE: 98 F | HEART RATE: 99 BPM | OXYGEN SATURATION: 100 % | DIASTOLIC BLOOD PRESSURE: 72 MMHG

## 2025-01-06 LAB
-  AMIKACIN: SIGNIFICANT CHANGE UP
-  AMIKACIN: SIGNIFICANT CHANGE UP
-  AMOXICILLIN/CLAVULANIC ACID: SIGNIFICANT CHANGE UP
-  AMPICILLIN/SULBACTAM: SIGNIFICANT CHANGE UP
-  AMPICILLIN: SIGNIFICANT CHANGE UP
-  AZTREONAM: SIGNIFICANT CHANGE UP
-  CEFAZOLIN: SIGNIFICANT CHANGE UP
-  CEFEPIME: SIGNIFICANT CHANGE UP
-  CEFOXITIN: SIGNIFICANT CHANGE UP
-  CEFTAZIDIME: SIGNIFICANT CHANGE UP
-  CEFTAZIDIME: SIGNIFICANT CHANGE UP
-  CEFTRIAXONE: SIGNIFICANT CHANGE UP
-  CIPROFLOXACIN: SIGNIFICANT CHANGE UP
-  ERTAPENEM: SIGNIFICANT CHANGE UP
-  GENTAMICIN: SIGNIFICANT CHANGE UP
-  IMIPENEM: SIGNIFICANT CHANGE UP
-  LEVOFLOXACIN: SIGNIFICANT CHANGE UP
-  MEROPENEM: SIGNIFICANT CHANGE UP
-  PIPERACILLIN/TAZOBACTAM: SIGNIFICANT CHANGE UP
-  TOBRAMYCIN: SIGNIFICANT CHANGE UP
-  TRIMETHOPRIM/SULFAMETHOXAZOLE: SIGNIFICANT CHANGE UP
-  VANCOMYCIN: SIGNIFICANT CHANGE UP
BASOPHILS # BLD AUTO: 0.08 K/UL — SIGNIFICANT CHANGE UP (ref 0–0.2)
BASOPHILS # BLD AUTO: 0.09 K/UL — SIGNIFICANT CHANGE UP (ref 0–0.2)
BASOPHILS NFR BLD AUTO: 0.5 % — SIGNIFICANT CHANGE UP (ref 0–2)
BASOPHILS NFR BLD AUTO: 0.6 % — SIGNIFICANT CHANGE UP (ref 0–2)
CULTURE RESULTS: ABNORMAL
EOSINOPHIL # BLD AUTO: 0.36 K/UL — SIGNIFICANT CHANGE UP (ref 0–0.5)
EOSINOPHIL # BLD AUTO: 0.39 K/UL — SIGNIFICANT CHANGE UP (ref 0–0.5)
EOSINOPHIL NFR BLD AUTO: 2 % — SIGNIFICANT CHANGE UP (ref 0–6)
EOSINOPHIL NFR BLD AUTO: 2.8 % — SIGNIFICANT CHANGE UP (ref 0–6)
HCT VFR BLD CALC: 23.7 % — LOW (ref 34.5–45)
HCT VFR BLD CALC: 23.9 % — LOW (ref 34.5–45)
HGB BLD-MCNC: 7.6 G/DL — LOW (ref 11.5–15.5)
HGB BLD-MCNC: 7.8 G/DL — LOW (ref 11.5–15.5)
IANC: 12 K/UL — HIGH (ref 1.8–7.4)
IANC: 9.37 K/UL — HIGH (ref 1.8–7.4)
IMM GRANULOCYTES NFR BLD AUTO: 5.1 % — HIGH (ref 0–0.9)
IMM GRANULOCYTES NFR BLD AUTO: 7.7 % — HIGH (ref 0–0.9)
LYMPHOCYTES # BLD AUTO: 1.67 K/UL — SIGNIFICANT CHANGE UP (ref 1–3.3)
LYMPHOCYTES # BLD AUTO: 12.1 % — LOW (ref 13–44)
LYMPHOCYTES # BLD AUTO: 18 % — SIGNIFICANT CHANGE UP (ref 13–44)
LYMPHOCYTES # BLD AUTO: 3.28 K/UL — SIGNIFICANT CHANGE UP (ref 1–3.3)
MCHC RBC-ENTMCNC: 27.2 PG — SIGNIFICANT CHANGE UP (ref 27–34)
MCHC RBC-ENTMCNC: 27.4 PG — SIGNIFICANT CHANGE UP (ref 27–34)
MCHC RBC-ENTMCNC: 31.8 G/DL — LOW (ref 32–36)
MCHC RBC-ENTMCNC: 32.9 G/DL — SIGNIFICANT CHANGE UP (ref 32–36)
MCV RBC AUTO: 83.2 FL — SIGNIFICANT CHANGE UP (ref 80–100)
MCV RBC AUTO: 85.7 FL — SIGNIFICANT CHANGE UP (ref 80–100)
METHOD TYPE: SIGNIFICANT CHANGE UP
MONOCYTES # BLD AUTO: 1.26 K/UL — HIGH (ref 0–0.9)
MONOCYTES # BLD AUTO: 1.57 K/UL — HIGH (ref 0–0.9)
MONOCYTES NFR BLD AUTO: 8.6 % — SIGNIFICANT CHANGE UP (ref 2–14)
MONOCYTES NFR BLD AUTO: 9.1 % — SIGNIFICANT CHANGE UP (ref 2–14)
NEUTROPHILS # BLD AUTO: 12 K/UL — HIGH (ref 1.8–7.4)
NEUTROPHILS # BLD AUTO: 9.37 K/UL — HIGH (ref 1.8–7.4)
NEUTROPHILS NFR BLD AUTO: 65.8 % — SIGNIFICANT CHANGE UP (ref 43–77)
NEUTROPHILS NFR BLD AUTO: 67.7 % — SIGNIFICANT CHANGE UP (ref 43–77)
NRBC # BLD: 0 /100 WBCS — SIGNIFICANT CHANGE UP (ref 0–0)
NRBC # BLD: 0 /100 WBCS — SIGNIFICANT CHANGE UP (ref 0–0)
NRBC # FLD: 0.02 K/UL — HIGH (ref 0–0)
NRBC # FLD: 0.06 K/UL — HIGH (ref 0–0)
ORGANISM # SPEC MICROSCOPIC CNT: ABNORMAL
PLATELET # BLD AUTO: 348 K/UL — SIGNIFICANT CHANGE UP (ref 150–400)
PLATELET # BLD AUTO: 350 K/UL — SIGNIFICANT CHANGE UP (ref 150–400)
RBC # BLD: 2.79 M/UL — LOW (ref 3.8–5.2)
RBC # BLD: 2.85 M/UL — LOW (ref 3.8–5.2)
RBC # FLD: 13.8 % — SIGNIFICANT CHANGE UP (ref 10.3–14.5)
RBC # FLD: 14.1 % — SIGNIFICANT CHANGE UP (ref 10.3–14.5)
SPECIMEN SOURCE: SIGNIFICANT CHANGE UP
WBC # BLD: 13.83 K/UL — HIGH (ref 3.8–10.5)
WBC # BLD: 18.23 K/UL — HIGH (ref 3.8–10.5)
WBC # FLD AUTO: 13.83 K/UL — HIGH (ref 3.8–10.5)
WBC # FLD AUTO: 18.23 K/UL — HIGH (ref 3.8–10.5)

## 2025-01-06 PROCEDURE — 93970 EXTREMITY STUDY: CPT | Mod: 26

## 2025-01-06 RX ORDER — ACETAMINOPHEN 80 MG/.8ML
3 SOLUTION/ DROPS ORAL
Qty: 0 | Refills: 0 | DISCHARGE
Start: 2025-01-06

## 2025-01-06 RX ORDER — IBUPROFEN 200 MG
1 TABLET ORAL
Qty: 30 | Refills: 0
Start: 2025-01-06

## 2025-01-06 RX ORDER — DOXYCYCLINE MONOHYDRATE 100 MG
2 TABLET ORAL
Qty: 16 | Refills: 0
Start: 2025-01-06 | End: 2025-01-09

## 2025-01-06 RX ORDER — PNV/FERROUS FUM/DOCUSATE/FOLIC 90-50-1MG
1 TABLET, EXTENDED RELEASE ORAL
Refills: 0 | DISCHARGE

## 2025-01-06 RX ORDER — ASPIRIN 81 MG
0 TABLET, DELAYED RELEASE (ENTERIC COATED) ORAL
Refills: 0 | DISCHARGE

## 2025-01-06 RX ORDER — DOXYCYCLINE MONOHYDRATE 100 MG
100 TABLET ORAL EVERY 12 HOURS
Refills: 0 | Status: DISCONTINUED | OUTPATIENT
Start: 2025-01-06 | End: 2025-01-06

## 2025-01-06 RX ORDER — IRON SUCROSE 20 MG/ML
200 INJECTION, SOLUTION INTRAVENOUS ONCE
Refills: 0 | Status: COMPLETED | OUTPATIENT
Start: 2025-01-06 | End: 2025-01-06

## 2025-01-06 RX ADMIN — SODIUM CHLORIDE 3 MILLILITER(S): 9 INJECTION, SOLUTION INTRAMUSCULAR; INTRAVENOUS; SUBCUTANEOUS at 06:26

## 2025-01-06 RX ADMIN — IRON SUCROSE 110 MILLIGRAM(S): 20 INJECTION, SOLUTION INTRAVENOUS at 08:38

## 2025-01-06 RX ADMIN — SODIUM CHLORIDE 3 MILLILITER(S): 9 INJECTION, SOLUTION INTRAMUSCULAR; INTRAVENOUS; SUBCUTANEOUS at 15:01

## 2025-01-06 RX ADMIN — Medication 100 MILLIGRAM(S): at 08:38

## 2025-01-06 RX ADMIN — Medication 500 MILLIGRAM(S): at 11:38

## 2025-01-06 RX ADMIN — Medication 325 MILLIGRAM(S): at 15:08

## 2025-01-06 RX ADMIN — PIPERACILLIN AND TAZOBACTAM 25 GRAM(S): 3; .375 INJECTION, POWDER, LYOPHILIZED, FOR SOLUTION INTRAVENOUS at 11:38

## 2025-01-06 RX ADMIN — Medication 325 MILLIGRAM(S): at 06:26

## 2025-01-06 RX ADMIN — PIPERACILLIN AND TAZOBACTAM 25 GRAM(S): 3; .375 INJECTION, POWDER, LYOPHILIZED, FOR SOLUTION INTRAVENOUS at 02:06

## 2025-01-06 RX ADMIN — Medication 100 MILLIGRAM(S): at 21:57

## 2025-01-06 NOTE — DISCHARGE NOTE OB - PATIENT PORTAL LINK FT
You can access the FollowMyHealth Patient Portal offered by [facility] by registering at the following website: [email or web address]. By joining [employer]’s FollowMyHealth portal, you will also be able to view your health information using other applications (apps) compatible with our system.

## 2025-01-06 NOTE — DISCHARGE NOTE OB - FINANCIAL ASSISTANCE
[facility] provides services at a reduced cost to those who are determined to be eligible through [facility]’s financial assistance program. Information regarding [facility]’s financial assistance program can be found by going to [email or web address] or by calling [phone].

## 2025-01-06 NOTE — PROGRESS NOTE ADULT - ASSESSMENT
A/P: 24y  PPD#1 from  of mono-di TIUP a/w painless cervical dilation/PTL with extramural delivery of twin A, s/p IOL for twin B due to concern for chorioamnionitis. Now also POD#1 from D+C for retained placenta. Pt afebrile and hemodynamically stable overnight.    #Chorio  -T38.1(@630a)  -S/p Zosyn x24h  -WBC 16, continue to trend leukocytosis and fever curve  -Lactate 1.6  -f/u AM CBC  -f/u placental Cx ()    #Postpartum state  -Reg diet  -SCDs/ambulation for DVT ppx  -Social work consult  -DTV@1030a  -Fetal demise paperwork completed: pt desires private burial x2, desires cytogenetics, declines autopsy    VTimmel PGY3
23yo  ppd1 s/p periviable pprom of mono-di twins (18+3) and chorioamnionitis in setting of known shortened cervix. Postpartum course c/b retained placenta s/p D&C. Currently clinically stable.   total qbl 300cc  hgb 10.3-->8.6  wbc 16-->22.6    - condolences offered, discussed bereavement support to which pt is open to  - sw consult placed, for eval today, recs appreciated  - discussed autopsy/cytogenetics, pt declines  - discussed my recommendation would be 24hr zosyn given wbc 22.6 this AM, pt agreeable  - f/u CBC tomorrow (1/6) at 4am to help expedite pt discharge from hospital  - monitor vs per unit protocol  - pad counts  - pt counseled extensively on pp precautions/red flag sx necessitating urgent eval by MD, pt verbalizes understanding  - regular diet  - encourage ambulation  
A/P: 24y  PPD#2 from  of mono-di TIUP a/w painless cervical dilation/PTL with extramural delivery of twin A, s/p IOL for twin B due to concern for chorioamnionitis, c/b retained placenta s/p D&C. Pt afebrile and hemodynamically stable overnight.    #Chorioamnionitis  # Delivery  -T38.1(@630a)  -S/p Zosyn x48h  -Doxy 100 mg BID x5d (-)  -WBC 16->22->18  -Lactate 1.6  -Placental cx (): growing multiple organisms; prelim    #Anemia  -Venofer x1 today  -Repeat CBC @6p    #Postpartum state  -Reg diet  -SCDs/ambulation for DVT ppx  -Social work consult  -Fetal demise paperwork completed: pt desires private burial x2, desires cytogenetics, declines autopsy  -Dopplers LE for R LE pain    Plan d/w Dr. Adonis Field PGY3

## 2025-01-06 NOTE — DISCHARGE NOTE OB - MEDICATION SUMMARY - MEDICATIONS TO TAKE
I will START or STAY ON the medications listed below when I get home from the hospital:    ibuprofen 600 mg oral tablet  -- 1 tab(s) by mouth every 6 hours  -- Indication: For pain    acetaminophen 325 mg oral tablet  -- 3 tab(s) by mouth every 6 hours  -- Indication: For pain    doxycycline monohydrate 50 mg oral capsule  -- 2 cap(s) by mouth every 12 hours  -- Indication: For infection

## 2025-01-06 NOTE — DISCHARGE NOTE OB - HOSPITAL COURSE
24y  postpartum from  of mono-di TIUP a/w painless cervical dilation/PTL with extramural delivery of twin A, s/p IOL for twin B due to concern for chorioamnionitis, c/b retained placenta s/p D&C. Patient treated initially with Zosyn and then with Doxycycline. Downtrend of leukocytosis noted, and patient cleared for discharge home.

## 2025-01-06 NOTE — DISCHARGE NOTE OB - PLAN OF CARE
Patient presented with PPROM and chorio. Delivery of twin A and B. Patient to be treated with Doxycycline 100mg BID x5 days.

## 2025-01-06 NOTE — DISCHARGE NOTE OB - CARE PLAN
1 Principal Discharge DX:	Rupture, membranes, premature  Assessment and plan of treatment:	Patient presented with PPROM and chorio. Delivery of twin A and B. Patient to be treated with Doxycycline 100mg BID x5 days.  Secondary Diagnosis:	Missed  with fetal demise before 20 completed weeks of gestation

## 2025-01-06 NOTE — PROGRESS NOTE ADULT - SUBJECTIVE AND OBJECTIVE BOX
POST OP DAY  2 s/p D&C for retained placenta.    SUBJECTIVE: Feels well. Denies headache, lightheadedness, paresthesias.    PAIN SCALE SCORE: [x] Refer to charted pain scores    THERAPY:  [ x ] Spinal morphine   [  ] Epidural morphine   [  ] IV PCA Hydromorphone 1 mg/ml    OBJECTIVE:     SEDATION SCORE:	  [ x ] Alert	    [  ] Drowsy        [  ] Arousable	[  ] Asleep	[  ] Unresponsive    Side Effects:	  [ x ] None	     [  ] Nausea        [  ] Pruritus        [  ] Weakness   [  ] Numbness        ASSESSMENT/ PLAN   [   ] Discontinue         [  ] Continue    [ x ] Change to prn Analgesics as per primary service.    DOCUMENTATION & VERIFICATION OF CURRENT MEDS [ x ] Done  
OB Progress Note    S: Patient feels well. She denies pain. She is tolerating a regular diet. She is voiding spontaneously and ambulating without difficulty. Endorses light vaginal bleeding, soaking < 1 pad/hour. Denies CP/SOB, lightheadedness/dizziness, N/V. Reports persistent right calf pain, same as yesterday.    MEDICATIONS  (STANDING):  acetaminophen     Tablet .. 975 milliGRAM(s) Oral <User Schedule>  ascorbic acid 500 milliGRAM(s) Oral daily  diphtheria/tetanus/pertussis (acellular) Vaccine (Adacel) 0.5 milliLiter(s) IntraMuscular once  doxycycline monohydrate Capsule 100 milliGRAM(s) Oral every 12 hours  ferrous    sulfate 325 milliGRAM(s) Oral three times a day  ibuprofen  Tablet. 600 milliGRAM(s) Oral every 6 hours  piperacillin/tazobactam IVPB.. 3.375 Gram(s) IV Intermittent every 8 hours  senna 2 Tablet(s) Oral at bedtime  sodium chloride 0.9% lock flush 3 milliLiter(s) IV Push every 8 hours      MEDICATIONS  (PRN):  benzocaine 20%/menthol 0.5% Spray 1 Spray(s) Topical every 6 hours PRN for Perineal discomfort  dibucaine 1% Ointment 1 Application(s) Topical every 6 hours PRN Perineal discomfort  diphenhydrAMINE 25 milliGRAM(s) Oral every 6 hours PRN Pruritus  hydrocortisone 1% Cream 1 Application(s) Topical every 6 hours PRN Moderate Pain (4-6)  lanolin Ointment 1 Application(s) Topical every 6 hours PRN nipple soreness  magnesium hydroxide Suspension 30 milliLiter(s) Oral two times a day PRN Constipation  oxyCODONE    IR 5 milliGRAM(s) Oral every 3 hours PRN Moderate to Severe Pain (4-10)  oxyCODONE    IR 5 milliGRAM(s) Oral once PRN Moderate to Severe Pain (4-10)  pramoxine 1%/zinc 5% Cream 1 Application(s) Topical every 4 hours PRN Moderate Pain (4-6)  simethicone 80 milliGRAM(s) Chew every 4 hours PRN Gas  witch hazel Pads 1 Application(s) Topical every 4 hours PRN Perineal discomfort      O:  Vitals:   Vital Signs Last 24 Hrs  T(C): 36.6 (06 Jan 2025 09:40), Max: 36.8 (05 Jan 2025 17:37)  T(F): 97.8 (06 Jan 2025 09:40), Max: 98.3 (05 Jan 2025 17:37)  HR: 96 (06 Jan 2025 09:40) (90 - 100)  BP: 96/60 (06 Jan 2025 09:40) (91/56 - 100/62)  BP(mean): --  RR: 15 (06 Jan 2025 09:40) (15 - 20)  SpO2: 100% (06 Jan 2025 09:40) (100% - 100%)    Parameters below as of 06 Jan 2025 09:40  Patient On (Oxygen Delivery Method): room air        Physical Exam:  General: NAD  Heart: Clinically well-perfused  Lungs: Breathing comfortably on room air  Abdomen: Soft, non-tender, non-distended, fundus firm  Ext: B/l LE non-tender, Kylah's sign negative    Labs:  Blood type: A Positive  Rubella IgG: RPR: Negative                          7.6[L]   18.23[H] >-----------< 350    ( 01-06 @ 01:56 )             23.9[L]                        8.6[L]   22.68[H] >-----------< 334    ( 01-05 @ 05:30 )             25.7[L]                        10.3[L]   16.17[H] >-----------< 365    ( 01-04 @ 06:15 )             32.3[L]    01-04-25 @ 14:15      135  |  103  |  4[L]  ----------------------------<  85  3.7   |  20[L]  |  0.57        Ca    9.3      04 Jan 2025 14:15    TPro  6.6  /  Alb  3.6  /  TBili  0.2  /  DBili  x   /  AST  22  /  ALT  50[H]  /  AlkPhos  63  01-04-25 @ 14:15    
Patient seen at bedside this morning. Tearful, resting comfortably with partner. Denies fever/chills, HA, visual changes, chest pain, SOB, severe abd/pelvic pain, heavy bleeding. Declines autopsy. 
R3 Progress Note    Patient seen and examined at bedside, no acute overnight events. No acute complaints, pain well controlled. Patient is ambulating, voiding spontaneously, passing gas, and tolerating regular diet. Denies CP, SOB, N/V, HA, blurred vision, epigastric pain.    Vital Signs Last 24 Hours  T(C): 36.7 (01-05-25 @ 06:09), Max: 37.2 (01-04-25 @ 14:00)  HR: 93 (01-05-25 @ 06:09) (69 - 123)  BP: 96/63 (01-05-25 @ 06:09) (93/56 - 136/72)  RR: 18 (01-05-25 @ 06:09) (15 - 20)  SpO2: 99% (01-05-25 @ 06:09) (83% - 100%)    Physical Exam:  General: NAD  Abdomen: Soft, non-tender, non-distended, fundus firm  Pelvic: Lochia wnl    Labs:    Blood Type: A Positive  Antibody Screen: --  RPR: Negative               8.6    22.68 )-----------( 334      ( 01-05 @ 05:30 )             25.7                10.3   16.17 )-----------( 365      ( 01-04 @ 06:15 )             32.3         MEDICATIONS  (STANDING):  acetaminophen     Tablet .. 975 milliGRAM(s) Oral <User Schedule>  diphtheria/tetanus/pertussis (acellular) Vaccine (Adacel) 0.5 milliLiter(s) IntraMuscular once  doxycycline IVPB 200 milliGRAM(s) IV Intermittent once  ibuprofen  Tablet. 600 milliGRAM(s) Oral every 6 hours  ketorolac   Injectable 30 milliGRAM(s) IV Push once  oxytocin Infusion 167 milliUNIT(s)/Min (167 mL/Hr) IV Continuous <Continuous>  oxytocin Infusion 167 milliUNIT(s)/Min (167 mL/Hr) IV Continuous <Continuous>  piperacillin/tazobactam IVPB.. 3.375 Gram(s) IV Intermittent every 8 hours  sodium chloride 0.9% lock flush 3 milliLiter(s) IV Push every 8 hours    MEDICATIONS  (PRN):  benzocaine 20%/menthol 0.5% Spray 1 Spray(s) Topical every 6 hours PRN for Perineal discomfort  dibucaine 1% Ointment 1 Application(s) Topical every 6 hours PRN Perineal discomfort  diphenhydrAMINE 25 milliGRAM(s) Oral every 6 hours PRN Pruritus  hydrocortisone 1% Cream 1 Application(s) Topical every 6 hours PRN Moderate Pain (4-6)  lanolin Ointment 1 Application(s) Topical every 6 hours PRN nipple soreness  magnesium hydroxide Suspension 30 milliLiter(s) Oral two times a day PRN Constipation  oxyCODONE    IR 5 milliGRAM(s) Oral every 3 hours PRN Moderate to Severe Pain (4-10)  oxyCODONE    IR 5 milliGRAM(s) Oral once PRN Moderate to Severe Pain (4-10)  pramoxine 1%/zinc 5% Cream 1 Application(s) Topical every 4 hours PRN Moderate Pain (4-6)  simethicone 80 milliGRAM(s) Chew every 4 hours PRN Gas  witch hazel Pads 1 Application(s) Topical every 4 hours PRN Perineal discomfort

## 2025-01-06 NOTE — DISCHARGE NOTE OB - MEDICATION SUMMARY - MEDICATIONS TO STOP TAKING
I will STOP taking the medications listed below when I get home from the hospital:    aspirin 81 mg oral tablet  -- orally once a day    PNV Prenatal oral tablet  -- 1 tab(s) by mouth once a day

## 2025-01-06 NOTE — DISCHARGE NOTE OB - CARE PROVIDER_API CALL
Vicenta Lamb  Obstetrics and Gynecology  [id number] [address] [place] [address], [address] [address]  Phone: [phone]  Fax: [phone]  Follow Up Time:

## 2025-01-06 NOTE — CHART NOTE - NSCHARTNOTEFT_GEN_A_CORE
T(C): 36.7 (25 @ 05:57), Max: 36.8 (25 @ 10:14)  T(F): 98.1 (25 @ 05:57), Max: 98.3 (25 @ 10:14)  HR: 100 (25 @ 05:57) (90 - 100)  BP: 91/56 (25 @ 05:57) (91/56 - 108/66)  RR: 17 (25 @ 05:57) (16 - 20)  SpO2: 100% (25 @ 05:57) (98% - 100%)  Wt(kg): --      LABS:  cret                        7.6    18.23 )-----------( 350      ( 2025 01:56 )             23.9         135  |  103  |  4[L]  ----------------------------<  85  3.7   |  20[L]  |  0.57    Ca    9.3      2025 14:15    TPro  6.6  /  Alb  3.6  /  TBili  0.2  /  DBili  x   /  AST  22  /  ALT  50[H]  /  AlkPhos  63      --------------------------------------------------------------------------------------------------------------------------------------------------------------------------------        Patient is s/p  for PPROM of non-viable/previable TIUP fetal demise x 2 on 25. s/p D&C for retained placenta on 25.   Chorioamnionitis. Right calf pain on 25--ordered for doppler study of B/L LE.    --RIGHT L/E PAIN--Doppler study pending as of 7am  --LEUKOCYTOSIS--WBC 22.6>>18.2  --ELEVATED LFT--AST/ALT 22/50  --ANEMIA--H/H 7.6/23.9  --CHORIOAMNIONITIS--continue IV Zosyn          Patient chart was reviewed with MD Lamb.     PLAN     1. Start patient on Doxycycline 100mg BID x 5 days, upon discharge, remaining dose of antibiotics Rx will be sent    2. CBC ordered for 6pm today    3. Patient to continue on IV Zosyn until 6pm CBC results are back     4. offered and ordered IV Venofer x 1 dose now    5. f/u with vascular for ordered doppler study status--pending as of 25    6. Continue to monitor        Discussed with MD Adonis Licona

## 2025-01-07 DIAGNOSIS — O30.032 TWIN PREGNANCY, MONOCHORIONIC/DIAMNIOTIC, SECOND TRIMESTER: ICD-10-CM

## 2025-01-07 DIAGNOSIS — Z03.71 ENCOUNTER FOR SUSPECTED PROBLEM WITH AMNIOTIC CAVITY AND MEMBRANE RULED OUT: ICD-10-CM

## 2025-01-07 DIAGNOSIS — Z3A.18 18 WEEKS GESTATION OF PREGNANCY: ICD-10-CM

## 2025-01-09 LAB
CULTURE RESULTS: ABNORMAL
ORGANISM # SPEC MICROSCOPIC CNT: ABNORMAL
SPECIMEN SOURCE: SIGNIFICANT CHANGE UP

## 2025-01-14 PROBLEM — Z00.00 ENCOUNTER FOR PREVENTIVE HEALTH EXAMINATION: Status: ACTIVE | Noted: 2025-01-14

## 2025-01-17 LAB — SURGICAL PATHOLOGY STUDY: SIGNIFICANT CHANGE UP

## 2025-01-22 LAB — CHROM ANALY OVERALL INTERP SPEC-IMP: SIGNIFICANT CHANGE UP

## 2025-02-12 NOTE — DISCHARGE NOTE OB - NURSING SECTION COMPLETE
Start and complete course of Keflex to prevent infection of finger. Keep the finger clean and bandaged. Take ibuprofen 800 mg for mild to moderate pain. Reserve Percocet 5 for severe pain only. Follow up with Dr. Trevino (hand clinic/ortho). Referrals have been provided.       Change the dressings once a day. Remember to wet the surgicel and then peel off before applying new bandages.   
Patient/Caregiver provided printed discharge information.

## 2025-07-29 NOTE — OB PROVIDER H&P - CLICK TO LAUNCH ORM
-advised patient to continue current lifestyle changes that involve more fruit and vegetables and incorporating exercise in their routine.    .